# Patient Record
Sex: FEMALE | Race: WHITE | NOT HISPANIC OR LATINO
[De-identification: names, ages, dates, MRNs, and addresses within clinical notes are randomized per-mention and may not be internally consistent; named-entity substitution may affect disease eponyms.]

---

## 2021-08-23 ENCOUNTER — APPOINTMENT (OUTPATIENT)
Dept: HEART AND VASCULAR | Facility: CLINIC | Age: 86
End: 2021-08-23
Payer: MEDICARE

## 2021-08-23 ENCOUNTER — NON-APPOINTMENT (OUTPATIENT)
Age: 86
End: 2021-08-23

## 2021-08-23 VITALS — TEMPERATURE: 95.2 F

## 2021-08-23 VITALS
SYSTOLIC BLOOD PRESSURE: 125 MMHG | WEIGHT: 188 LBS | DIASTOLIC BLOOD PRESSURE: 60 MMHG | OXYGEN SATURATION: 97 % | BODY MASS INDEX: 33.31 KG/M2 | HEIGHT: 63 IN | HEART RATE: 90 BPM

## 2021-08-23 VITALS — TEMPERATURE: 97.2 F

## 2021-08-23 DIAGNOSIS — E78.5 HYPERLIPIDEMIA, UNSPECIFIED: ICD-10-CM

## 2021-08-23 DIAGNOSIS — Z78.9 OTHER SPECIFIED HEALTH STATUS: ICD-10-CM

## 2021-08-23 DIAGNOSIS — I25.10 ATHEROSCLEROTIC HEART DISEASE OF NATIVE CORONARY ARTERY W/OUT ANGINA PECTORIS: ICD-10-CM

## 2021-08-23 DIAGNOSIS — R53.83 OTHER FATIGUE: ICD-10-CM

## 2021-08-23 PROBLEM — Z00.00 ENCOUNTER FOR PREVENTIVE HEALTH EXAMINATION: Status: ACTIVE | Noted: 2021-08-23

## 2021-08-23 PROCEDURE — 93000 ELECTROCARDIOGRAM COMPLETE: CPT

## 2021-08-23 PROCEDURE — 99205 OFFICE O/P NEW HI 60 MIN: CPT | Mod: 25

## 2021-08-23 PROCEDURE — 36415 COLL VENOUS BLD VENIPUNCTURE: CPT

## 2021-08-25 LAB
ALBUMIN SERPL ELPH-MCNC: 4.5 G/DL
ALP BLD-CCNC: 69 U/L
ALT SERPL-CCNC: 16 U/L
ANION GAP SERPL CALC-SCNC: 12 MMOL/L
APPEARANCE: CLEAR
AST SERPL-CCNC: 23 U/L
BACTERIA: NEGATIVE
BASOPHILS # BLD AUTO: 0.02 K/UL
BASOPHILS NFR BLD AUTO: 0.3 %
BILIRUB SERPL-MCNC: 0.6 MG/DL
BILIRUBIN URINE: NEGATIVE
BLOOD URINE: NEGATIVE
BUN SERPL-MCNC: 44 MG/DL
CALCIUM SERPL-MCNC: 10 MG/DL
CHLORIDE SERPL-SCNC: 101 MMOL/L
CHOLEST SERPL-MCNC: 231 MG/DL
CO2 SERPL-SCNC: 25 MMOL/L
COLOR: YELLOW
CREAT SERPL-MCNC: 1.21 MG/DL
CREAT SPEC-SCNC: 227 MG/DL
EOSINOPHIL # BLD AUTO: 0.16 K/UL
EOSINOPHIL NFR BLD AUTO: 2.2 %
ESTIMATED AVERAGE GLUCOSE: 103 MG/DL
GLUCOSE QUALITATIVE U: NEGATIVE
GLUCOSE SERPL-MCNC: 134 MG/DL
HBA1C MFR BLD HPLC: 5.2 %
HCT VFR BLD CALC: 40.6 %
HDLC SERPL-MCNC: 71 MG/DL
HGB BLD-MCNC: 12.7 G/DL
HYALINE CASTS: 100 /LPF
IMM GRANULOCYTES NFR BLD AUTO: 0.1 %
KETONES URINE: NEGATIVE
LDLC SERPL CALC-MCNC: 111 MG/DL
LEUKOCYTE ESTERASE URINE: NEGATIVE
LYMPHOCYTES # BLD AUTO: 2.16 K/UL
LYMPHOCYTES NFR BLD AUTO: 29.4 %
MAGNESIUM SERPL-MCNC: 2 MG/DL
MAN DIFF?: NORMAL
MCHC RBC-ENTMCNC: 31.3 GM/DL
MCHC RBC-ENTMCNC: 31.7 PG
MCV RBC AUTO: 101.2 FL
MICROALBUMIN 24H UR DL<=1MG/L-MCNC: 1.5 MG/DL
MICROALBUMIN/CREAT 24H UR-RTO: 7 MG/G
MICROSCOPIC-UA: NORMAL
MONOCYTES # BLD AUTO: 0.55 K/UL
MONOCYTES NFR BLD AUTO: 7.5 %
NEUTROPHILS # BLD AUTO: 4.45 K/UL
NEUTROPHILS NFR BLD AUTO: 60.5 %
NITRITE URINE: NEGATIVE
NONHDLC SERPL-MCNC: 160 MG/DL
PH URINE: 5.5
PLATELET # BLD AUTO: 204 K/UL
POTASSIUM SERPL-SCNC: 4.8 MMOL/L
PROT SERPL-MCNC: 7.6 G/DL
PROTEIN URINE: NORMAL
RBC # BLD: 4.01 M/UL
RBC # FLD: 12.8 %
RED BLOOD CELLS URINE: 2 /HPF
SODIUM SERPL-SCNC: 138 MMOL/L
SPECIFIC GRAVITY URINE: 1.02
SQUAMOUS EPITHELIAL CELLS: 6 /HPF
TRIGL SERPL-MCNC: 242 MG/DL
TSH SERPL-ACNC: 1.8 UIU/ML
UROBILINOGEN URINE: NORMAL
WBC # FLD AUTO: 7.35 K/UL
WHITE BLOOD CELLS URINE: 1 /HPF

## 2021-08-31 ENCOUNTER — RESULT REVIEW (OUTPATIENT)
Age: 86
End: 2021-08-31

## 2021-08-31 ENCOUNTER — APPOINTMENT (OUTPATIENT)
Dept: CT IMAGING | Facility: CLINIC | Age: 86
End: 2021-08-31
Payer: MEDICARE

## 2021-08-31 ENCOUNTER — OUTPATIENT (OUTPATIENT)
Dept: OUTPATIENT SERVICES | Facility: HOSPITAL | Age: 86
LOS: 1 days | End: 2021-08-31

## 2021-08-31 PROBLEM — I25.10 CORONARY ARTERY DISEASE: Status: ACTIVE | Noted: 2021-08-31

## 2021-08-31 PROCEDURE — 75574 CT ANGIO HRT W/3D IMAGE: CPT | Mod: 26,MH

## 2021-08-31 RX ORDER — SIMVASTATIN 40 MG/1
40 TABLET, FILM COATED ORAL
Refills: 0 | Status: DISCONTINUED | COMMUNITY
End: 2021-08-31

## 2021-08-31 NOTE — PHYSICAL EXAM
[Well Developed] : well developed [No Acute Distress] : no acute distress [Well Nourished] : well nourished [Normal Conjunctiva] : normal conjunctiva [Normal Venous Pressure] : normal venous pressure [No Carotid Bruit] : no carotid bruit [Normal S1, S2] : normal S1, S2 [No Rub] : no rub [No Gallop] : no gallop [Clear Lung Fields] : clear lung fields [Good Air Entry] : good air entry [No Respiratory Distress] : no respiratory distress  [Soft] : abdomen soft [Non Tender] : non-tender [No Masses/organomegaly] : no masses/organomegaly [Normal Bowel Sounds] : normal bowel sounds [Normal Gait] : normal gait [No Edema] : no edema [No Cyanosis] : no cyanosis [No Clubbing] : no clubbing [No Varicosities] : no varicosities [No Rash] : no rash [No Skin Lesions] : no skin lesions [Moves all extremities] : moves all extremities [No Focal Deficits] : no focal deficits [Normal Speech] : normal speech [Alert and Oriented] : alert and oriented [Normal memory] : normal memory [de-identified] : 3/6 hsm apex

## 2021-08-31 NOTE — HISTORY OF PRESENT ILLNESS
[FreeTextEntry1] : \par \par 85 y/o female with h/o htn, hl, obese who presents for initial evaluation for fatigue and abnormal ekg\par \par no cp, lh, syncope, orthopnea, pnd, palpitations\par notes some sob with exertion\par notes new fatigue\par \par \par not very active\par states she started taking swimming lessons \par  - noted increasing fatigue exertion, noted lower bp that evening\par \par went to walk in clinic - told she might have had MI on ekg\par taken to [facility] and in ER - told cv enzyme neg and told to f/up with cardiology\par \par diagnosed with htn, hl 20 yrs ago\par used to see cardiologist - 2 times - past 10 yrs\par \par \par losartan increased from 50 to 100 qd 6 months ago\par put on hctz 6 months ago for edema\par \par PMH/PSH:\par htn\par hl\par s/p appy\par s/p zain\par arthritis\par left hip/right knee replacement \par uterine benign tumor removal\par breast benign tumor removal\par obese\par \par SH:\par no tobacco/etoh/drugs\par from [place]\par /[place]/[employer]\par single\par live alone\par no children\par \par \par ALL:\par nkda\par \par MEDS:\par asa 81 mg qod\par losartan 100 mg qd\par zocor 40 mg qhs\par diuretic\par \par FH:\par mother -  61 alcoholism, emphysema\par father -  \par sister - alive, 82, breast cancer

## 2021-08-31 NOTE — DISCUSSION/SUMMARY
[Patient] : the patient [___ Month(s)] : in [unfilled] month(s) [FreeTextEntry1] : \par \par 85 y/o female with h/o htn, hl, obese who presents for initial evaluation for fatigue and abnormal ekg\par \par -ordered ekg today - nsr, normal intervals, possible old IMI ernie\par -start asa daily\par -continue statin, losartan, hctz\par -ordered CTA cor \par -ordered Echo\par -counseled on cvd risk factors\par -ordered labs\par -f/up 3 months for htn, hl\par \par I have spent 60 minutes reviewing labs, records, tests and discussing htn, fatigue workup, cvd risk factors

## 2021-10-20 ENCOUNTER — FORM ENCOUNTER (OUTPATIENT)
Age: 86
End: 2021-10-20

## 2021-10-21 ENCOUNTER — OUTPATIENT (OUTPATIENT)
Dept: OUTPATIENT SERVICES | Facility: HOSPITAL | Age: 86
LOS: 1 days | End: 2021-10-21
Payer: MEDICARE

## 2021-10-21 DIAGNOSIS — R06.00 DYSPNEA, UNSPECIFIED: ICD-10-CM

## 2021-10-21 PROCEDURE — 93306 TTE W/DOPPLER COMPLETE: CPT | Mod: 26

## 2021-10-21 PROCEDURE — 93306 TTE W/DOPPLER COMPLETE: CPT

## 2021-10-30 ENCOUNTER — TRANSCRIPTION ENCOUNTER (OUTPATIENT)
Age: 86
End: 2021-10-30

## 2022-01-03 ENCOUNTER — RX RENEWAL (OUTPATIENT)
Age: 87
End: 2022-01-03

## 2022-07-28 ENCOUNTER — APPOINTMENT (OUTPATIENT)
Dept: HEART AND VASCULAR | Facility: CLINIC | Age: 87
End: 2022-07-28

## 2022-07-28 ENCOUNTER — NON-APPOINTMENT (OUTPATIENT)
Age: 87
End: 2022-07-28

## 2022-07-28 VITALS
DIASTOLIC BLOOD PRESSURE: 75 MMHG | HEART RATE: 77 BPM | TEMPERATURE: 97 F | HEIGHT: 63 IN | SYSTOLIC BLOOD PRESSURE: 112 MMHG | BODY MASS INDEX: 32.96 KG/M2 | OXYGEN SATURATION: 96 % | WEIGHT: 186 LBS

## 2022-07-28 PROCEDURE — 93000 ELECTROCARDIOGRAM COMPLETE: CPT

## 2022-07-28 PROCEDURE — 99214 OFFICE O/P EST MOD 30 MIN: CPT | Mod: 25

## 2022-07-28 RX ORDER — HYDROCHLOROTHIAZIDE 12.5 MG/1
12.5 CAPSULE ORAL
Qty: 90 | Refills: 0 | Status: DISCONTINUED | COMMUNITY
Start: 2021-06-16 | End: 2022-07-28

## 2022-07-28 NOTE — DISCUSSION/SUMMARY
[Patient] : the patient [EKG obtained to assist in diagnosis and management of assessed problem(s)] : EKG obtained to assist in diagnosis and management of assessed problem(s) [___ Week(s)] : in [unfilled] week(s) [FreeTextEntry1] : \par 88 y/o female with h/o htn, hl, obese, cad who presents for f/up today\par \par -ordered ekg today - nsr, normal intervals, possible old IMI ernie\par -continue asa, statin, losartan\par -dc hctz\par -CTA 8/21: calcium score 127, prox LAD minimal, D1 mild, mid RCA minimal, mild aortic and mild AV calcifications\par -Echo 10/21: LV normal size/fxn, ef 70-75%, no wma, trace mr/tr\par -counseled on cvd risk factors\par -labs 2022 reviewed\par -f/up 3 weeks for bp\par \par I have spent 30 minutes reviewing labs, records, tests and discussing cad management, bp\par \par

## 2022-07-28 NOTE — HISTORY OF PRESENT ILLNESS
[FreeTextEntry1] : 88 y/o female with h/o htn, hl, obese, cad who presents for f/up today\par \par last seen  for fatigue and abnormal ekg\par \par has been on asa, statin, hctz, arb\par \par \par last week notes episode low bp, dizziness\par notes sweating with exertion\par \par ordered CTA and Echo\par -CTA : calcium score 127, prox LAD minimal, D1 mild, mid RCA minimal, mild aortic and mild AV calcifications\par -Echo 10/21: LV normal size/fxn, ef 70-75%, no wma, trace mr/tr\par \par \par \par no cp, sob, lh, syncope, orthopnea, pnd, palpitations\par \par \par  \par  - noted increasing fatigue exertion, noted lower bp that evening\par went to walk in clinic - told she might have had MI on ekg\par taken to [facility] and in ER - told cv enzyme neg and told to f/up with cardiology\par \par diagnosed with htn, hl 20 yrs ago\par used to see cardiologist - 2 times - past 10 yrs\par \par \par \par PMH/PSH:\par htn\par hl\par s/p appy\par s/p zain\par arthritis\par left hip/right knee replacement \par uterine benign tumor removal\par breast benign tumor removal\par obese\par cad\par \par SH:\par no tobacco/etoh/drugs\par from [place]\par /[place]/[employer]\par single\par live alone\par no children\par \par \par ALL:\par nkda\par \par MEDS:\par asa 81 mg qod\par losartan 100 mg qd\par lipitor 40 mg qhs\par hctz 12.5 mg qd\par folic acid 800 mg qd\par hydroxyzine \par vit D3\par \par FH:\par mother -  61 alcoholism, emphysema\par father -  87\par sister - alive, 82, breast cancer \par

## 2022-08-11 ENCOUNTER — APPOINTMENT (OUTPATIENT)
Dept: HEART AND VASCULAR | Facility: CLINIC | Age: 87
End: 2022-08-11

## 2022-08-11 VITALS
BODY MASS INDEX: 33.66 KG/M2 | OXYGEN SATURATION: 96 % | HEIGHT: 63 IN | HEART RATE: 86 BPM | DIASTOLIC BLOOD PRESSURE: 60 MMHG | TEMPERATURE: 93.5 F | SYSTOLIC BLOOD PRESSURE: 93 MMHG | WEIGHT: 190 LBS

## 2022-08-11 VITALS — SYSTOLIC BLOOD PRESSURE: 95 MMHG | DIASTOLIC BLOOD PRESSURE: 65 MMHG

## 2022-08-11 PROCEDURE — 99214 OFFICE O/P EST MOD 30 MIN: CPT

## 2022-08-11 RX ORDER — LOSARTAN POTASSIUM 50 MG/1
50 TABLET, FILM COATED ORAL DAILY
Qty: 30 | Refills: 3 | Status: ACTIVE | COMMUNITY
Start: 1900-01-01 | End: 1900-01-01

## 2022-08-11 NOTE — HISTORY OF PRESENT ILLNESS
[FreeTextEntry1] : 88 y/o female with h/o htn, hl, obese, cad who presents for f/up today\par \par last seen  for for low bp/dizziness\par hctz recommended to dc but she did not\par she stopped the irbesartan\par \par has been on asa, statin, hctz, arb\par \par  \par \par ordered CTA and Echo\par -CTA : calcium score 127, prox LAD minimal, D1 mild, mid RCA minimal, mild aortic and mild AV calcifications\par -Echo 10/21: LV normal size/fxn, ef 70-75%, no wma, trace mr/tr\par \par \par \par no cp, sob, lh, syncope, orthopnea, pnd, palpitations\par \par \par  \par  - noted increasing fatigue exertion, noted lower bp that evening\par went to walk in clinic - told she might have had MI on ekg\par taken to [facility] and in ER - told cv enzyme neg and told to f/up with cardiology\par \par diagnosed with htn, hl 20 yrs ago\par used to see cardiologist - 2 times - past 10 yrs\par \par \par \par PMH/PSH:\par htn\par hl\par s/p appy\par s/p zain\par arthritis\par left hip/right knee replacement \par uterine benign tumor removal\par breast benign tumor removal\par obese\par cad\par \par SH:\par no tobacco/etoh/drugs\[place] from [place]\par /[place]\par single\par live alone\par no children\par \par \par ALL:\par nkda\par \par MEDS:\par asa 81 mg qod\par losartan 100 mg qd\par hctz 12.5 mg qd\par lipitor 40 mg qhs\par folic acid 800 mg qd\par hydroxyzine \par vit D3\par \par FH:\par mother -  61 alcoholism, emphysema\par father -  87\par sister - alive, 82, breast cancer \par

## 2022-08-11 NOTE — DISCUSSION/SUMMARY
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

## 2022-09-01 ENCOUNTER — APPOINTMENT (OUTPATIENT)
Dept: HEART AND VASCULAR | Facility: CLINIC | Age: 87
End: 2022-09-01

## 2022-09-01 VITALS — SYSTOLIC BLOOD PRESSURE: 128 MMHG | DIASTOLIC BLOOD PRESSURE: 75 MMHG | HEART RATE: 75 BPM

## 2022-09-01 VITALS
WEIGHT: 190 LBS | TEMPERATURE: 96.5 F | HEIGHT: 63 IN | BODY MASS INDEX: 33.66 KG/M2 | OXYGEN SATURATION: 96 % | DIASTOLIC BLOOD PRESSURE: 78 MMHG | HEART RATE: 84 BPM | SYSTOLIC BLOOD PRESSURE: 138 MMHG

## 2022-09-01 PROCEDURE — 99214 OFFICE O/P EST MOD 30 MIN: CPT

## 2022-09-01 NOTE — DISCUSSION/SUMMARY
[Patient] : the patient [___ Month(s)] : in [unfilled] month(s) [FreeTextEntry1] : 88 y/o female with h/o htn, hl, obese, cad who presents for f/up today\par \par -ekg 7/22 - nsr, normal intervals, possible old IMI ernie\par -continue asa, statin \par -continue losartan to 50 mg qd\par -CTA 8/21: calcium score 127, prox LAD minimal, D1 mild, mid RCA minimal, mild aortic and mild AV calcifications\par -Echo 10/21: LV normal size/fxn, ef 70-75%, no wma, trace mr/tr\par -counseled on cvd risk factors\par -labs 2022 reviewed\par -f/up 4-6 months for htn, cad\par \par I have spent 30 minutes reviewing labs, records, tests and discussing cad management, bp\par \par

## 2022-09-01 NOTE — HISTORY OF PRESENT ILLNESS
[FreeTextEntry1] : 86 y/o female with h/o htn, hl, obese, cad who presents for f/up today\par \par last seen  for for low bp/dizziness\par hctz dc'd and losartan lowered to 50\par \par \par  \par -CTA : calcium score 127, prox LAD minimal, D1 mild, mid RCA minimal, mild aortic and mild AV calcifications\par -Echo 10/21: LV normal size/fxn, ef 70-75%, no wma, trace mr/tr\par \par no cp, sob, lh, syncope, orthopnea, pnd, palpitations\par \par \par  \par  - noted increasing fatigue exertion, noted lower bp that evening\par went to walk in clinic - told she might have had MI on ekg\par taken to [facility] and in ER - told cv enzyme neg and told to f/up with cardiology\par \par diagnosed with htn, hl 20 yrs ago\par used to see cardiologist - 2 times - past 10 yrs\par \par \par \par PMH/PSH:\par htn\par hl\par s/p appy\par s/p zain\par arthritis\par left hip/right knee replacement \par uterine benign tumor removal\par breast benign tumor removal\par obese\par cad\par \par SH:\par no tobacco/etoh/drugs\[place] from [place]\par /[place]\par single\par live alone\par no children\par \par \par ALL:\par nkda\par \par MEDS:\par asa 81 mg qod\par losartan 50 mg qd\par lipitor 40 mg qhs\par folic acid 800 mg qd\par hydroxyzine \par vit D3\par \par FH:\par mother -  61 alcoholism, emphysema\par father -  87\par sister - alive, 82, breast cancer \par

## 2023-03-02 ENCOUNTER — APPOINTMENT (OUTPATIENT)
Dept: HEART AND VASCULAR | Facility: CLINIC | Age: 88
End: 2023-03-02
Payer: MEDICARE

## 2023-03-02 ENCOUNTER — NON-APPOINTMENT (OUTPATIENT)
Age: 88
End: 2023-03-02

## 2023-03-02 VITALS
WEIGHT: 190 LBS | OXYGEN SATURATION: 94 % | TEMPERATURE: 97 F | BODY MASS INDEX: 33.66 KG/M2 | SYSTOLIC BLOOD PRESSURE: 149 MMHG | HEIGHT: 63 IN | HEART RATE: 74 BPM | DIASTOLIC BLOOD PRESSURE: 75 MMHG

## 2023-03-02 VITALS — DIASTOLIC BLOOD PRESSURE: 80 MMHG | SYSTOLIC BLOOD PRESSURE: 130 MMHG

## 2023-03-02 PROCEDURE — 93000 ELECTROCARDIOGRAM COMPLETE: CPT

## 2023-03-02 PROCEDURE — 99214 OFFICE O/P EST MOD 30 MIN: CPT | Mod: 25

## 2023-03-02 NOTE — HISTORY OF PRESENT ILLNESS
[FreeTextEntry1] : 89 y/o female with h/o htn, hl, obese, cad who presents for f/up today\par \par last seen  \par \par went to [place] last few weeks, returned 2 weeks ago - noted LE edema (had stopped hctz last summer)\par no pain/tenderness/erythema\par  \par -CTA : calcium score 127, prox LAD minimal, D1 mild, mid RCA minimal, mild aortic and mild AV calcifications\par -Echo 10/21: LV normal size/fxn, ef 70-75%, no wma, trace mr/tr\par \par no cp, sob, lh, syncope, orthopnea, pnd, palpitations\par \par \par  \par  - noted increasing fatigue exertion, noted lower bp that evening\par went to walk in clinic - told she might have had MI on ekg\par taken to [facility] and in ER - told cv enzyme neg and told to f/up with cardiology\par \par diagnosed with htn, hl 20 yrs ago\par used to see cardiologist - 2 times - past 10 yrs\par \par \par \par PMH/PSH:\par htn\par hl\par s/p appy\par s/p zain\par arthritis\par left hip/right knee replacement \par uterine benign tumor removal\par breast benign tumor removal\par obese\par cad\par \par SH:\par no tobacco/etoh/drugs\[place] from [place]\par /[place]\par single\par live alone\par no children\par \par \par ALL:\par nkda\par \par MEDS:\par asa 81 mg qod\par losartan 50 mg qd\par lipitor 40 mg qhs\par folic acid 800 mg qd\par hydroxyzine \par vit D3\par \par FH:\par mother -  61 alcoholism, emphysema\par father -  87\par sister - alive, 82, breast cancer \par \par

## 2023-03-02 NOTE — DISCUSSION/SUMMARY
[Patient] : the patient [___ Week(s)] : in [unfilled] week(s) [EKG obtained to assist in diagnosis and management of assessed problem(s)] : EKG obtained to assist in diagnosis and management of assessed problem(s) [FreeTextEntry1] : 89 y/o female with h/o htn, hl, obese, cad who presents for f/up today\par \par -ordered Echo for le edema\par -ordered IRMA for edema to r/o dvt\par -ekg ordered today - nsr, normal intervals, no st/t changes\par -continue asa, statin \par -continue losartan 50 mg qd\par -start lasix 10 mg qd for edema, close monitoring bp\par -CTA 8/21: calcium score 127, prox LAD minimal, D1 mild, mid RCA minimal, mild aortic and mild AV calcifications\par -agree w f/up liver lesion seen on u/s, recommended mri\par -Echo 10/21: LV normal size/fxn, ef 70-75%, no wma, trace mr/tr\par -counseled on cvd risk factors\par -labs 2022 reviewed\par -f/up 2-3 weeks for bp, bmp/mg\par \par I have spent 30 minutes reviewing labs, records, tests and discussing cad management, bp

## 2023-03-03 ENCOUNTER — APPOINTMENT (OUTPATIENT)
Dept: HEART AND VASCULAR | Facility: CLINIC | Age: 88
End: 2023-03-03
Payer: MEDICARE

## 2023-03-03 ENCOUNTER — EMERGENCY (EMERGENCY)
Facility: HOSPITAL | Age: 88
LOS: 1 days | Discharge: HOME | End: 2023-03-03
Attending: EMERGENCY MEDICINE | Admitting: EMERGENCY MEDICINE
Payer: MEDICARE

## 2023-03-03 VITALS
SYSTOLIC BLOOD PRESSURE: 130 MMHG | HEART RATE: 78 BPM | OXYGEN SATURATION: 97 % | RESPIRATION RATE: 18 BRPM | TEMPERATURE: 98 F | HEIGHT: 63 IN | WEIGHT: 190.04 LBS | DIASTOLIC BLOOD PRESSURE: 77 MMHG

## 2023-03-03 VITALS
HEART RATE: 72 BPM | RESPIRATION RATE: 17 BRPM | DIASTOLIC BLOOD PRESSURE: 74 MMHG | OXYGEN SATURATION: 98 % | SYSTOLIC BLOOD PRESSURE: 134 MMHG | TEMPERATURE: 98 F

## 2023-03-03 DIAGNOSIS — R60.0 LOCALIZED EDEMA: ICD-10-CM

## 2023-03-03 DIAGNOSIS — R09.82 POSTNASAL DRIP: ICD-10-CM

## 2023-03-03 DIAGNOSIS — Z20.822 CONTACT WITH AND (SUSPECTED) EXPOSURE TO COVID-19: ICD-10-CM

## 2023-03-03 DIAGNOSIS — I25.10 ATHEROSCLEROTIC HEART DISEASE OF NATIVE CORONARY ARTERY WITHOUT ANGINA PECTORIS: ICD-10-CM

## 2023-03-03 DIAGNOSIS — R05.9 COUGH, UNSPECIFIED: ICD-10-CM

## 2023-03-03 DIAGNOSIS — I82.442 ACUTE EMBOLISM AND THROMBOSIS OF LEFT TIBIAL VEIN: ICD-10-CM

## 2023-03-03 DIAGNOSIS — E78.5 HYPERLIPIDEMIA, UNSPECIFIED: ICD-10-CM

## 2023-03-03 DIAGNOSIS — I82.432 ACUTE EMBOLISM AND THROMBOSIS OF LEFT POPLITEAL VEIN: ICD-10-CM

## 2023-03-03 DIAGNOSIS — R06.09 OTHER FORMS OF DYSPNEA: ICD-10-CM

## 2023-03-03 DIAGNOSIS — I10 ESSENTIAL (PRIMARY) HYPERTENSION: ICD-10-CM

## 2023-03-03 LAB
ANION GAP SERPL CALC-SCNC: 10 MMOL/L — SIGNIFICANT CHANGE UP (ref 5–17)
APTT BLD: 31.1 SEC — SIGNIFICANT CHANGE UP (ref 27.5–35.5)
BASOPHILS # BLD AUTO: 0.02 K/UL — SIGNIFICANT CHANGE UP (ref 0–0.2)
BASOPHILS NFR BLD AUTO: 0.3 % — SIGNIFICANT CHANGE UP (ref 0–2)
BUN SERPL-MCNC: 24 MG/DL — HIGH (ref 7–23)
CALCIUM SERPL-MCNC: 9.5 MG/DL — SIGNIFICANT CHANGE UP (ref 8.4–10.5)
CHLORIDE SERPL-SCNC: 102 MMOL/L — SIGNIFICANT CHANGE UP (ref 96–108)
CO2 SERPL-SCNC: 29 MMOL/L — SIGNIFICANT CHANGE UP (ref 22–31)
CREAT SERPL-MCNC: 0.97 MG/DL — SIGNIFICANT CHANGE UP (ref 0.5–1.3)
EGFR: 57 ML/MIN/1.73M2 — LOW
EOSINOPHIL # BLD AUTO: 0.19 K/UL — SIGNIFICANT CHANGE UP (ref 0–0.5)
EOSINOPHIL NFR BLD AUTO: 2.7 % — SIGNIFICANT CHANGE UP (ref 0–6)
GLUCOSE SERPL-MCNC: 100 MG/DL — HIGH (ref 70–99)
HCT VFR BLD CALC: 38.8 % — SIGNIFICANT CHANGE UP (ref 34.5–45)
HGB BLD-MCNC: 12.4 G/DL — SIGNIFICANT CHANGE UP (ref 11.5–15.5)
IMM GRANULOCYTES NFR BLD AUTO: 0.1 % — SIGNIFICANT CHANGE UP (ref 0–0.9)
INR BLD: 0.98 — SIGNIFICANT CHANGE UP (ref 0.88–1.16)
LYMPHOCYTES # BLD AUTO: 2.11 K/UL — SIGNIFICANT CHANGE UP (ref 1–3.3)
LYMPHOCYTES # BLD AUTO: 30.2 % — SIGNIFICANT CHANGE UP (ref 13–44)
MCHC RBC-ENTMCNC: 32 GM/DL — SIGNIFICANT CHANGE UP (ref 32–36)
MCHC RBC-ENTMCNC: 32 PG — SIGNIFICANT CHANGE UP (ref 27–34)
MCV RBC AUTO: 100 FL — SIGNIFICANT CHANGE UP (ref 80–100)
MONOCYTES # BLD AUTO: 0.56 K/UL — SIGNIFICANT CHANGE UP (ref 0–0.9)
MONOCYTES NFR BLD AUTO: 8 % — SIGNIFICANT CHANGE UP (ref 2–14)
NEUTROPHILS # BLD AUTO: 4.1 K/UL — SIGNIFICANT CHANGE UP (ref 1.8–7.4)
NEUTROPHILS NFR BLD AUTO: 58.7 % — SIGNIFICANT CHANGE UP (ref 43–77)
NRBC # BLD: 0 /100 WBCS — SIGNIFICANT CHANGE UP (ref 0–0)
PLATELET # BLD AUTO: 176 K/UL — SIGNIFICANT CHANGE UP (ref 150–400)
POTASSIUM SERPL-MCNC: 4.6 MMOL/L — SIGNIFICANT CHANGE UP (ref 3.5–5.3)
POTASSIUM SERPL-SCNC: 4.6 MMOL/L — SIGNIFICANT CHANGE UP (ref 3.5–5.3)
PROTHROM AB SERPL-ACNC: 11.6 SEC — SIGNIFICANT CHANGE UP (ref 10.5–13.4)
RBC # BLD: 3.88 M/UL — SIGNIFICANT CHANGE UP (ref 3.8–5.2)
RBC # FLD: 13.4 % — SIGNIFICANT CHANGE UP (ref 10.3–14.5)
SARS-COV-2 RNA SPEC QL NAA+PROBE: NEGATIVE — SIGNIFICANT CHANGE UP
SODIUM SERPL-SCNC: 141 MMOL/L — SIGNIFICANT CHANGE UP (ref 135–145)
WBC # BLD: 6.99 K/UL — SIGNIFICANT CHANGE UP (ref 3.8–10.5)
WBC # FLD AUTO: 6.99 K/UL — SIGNIFICANT CHANGE UP (ref 3.8–10.5)

## 2023-03-03 PROCEDURE — 93971 EXTREMITY STUDY: CPT | Mod: 26,LT

## 2023-03-03 PROCEDURE — 93971 EXTREMITY STUDY: CPT

## 2023-03-03 PROCEDURE — 85730 THROMBOPLASTIN TIME PARTIAL: CPT

## 2023-03-03 PROCEDURE — 99284 EMERGENCY DEPT VISIT MOD MDM: CPT | Mod: 25

## 2023-03-03 PROCEDURE — 36415 COLL VENOUS BLD VENIPUNCTURE: CPT

## 2023-03-03 PROCEDURE — 80048 BASIC METABOLIC PNL TOTAL CA: CPT

## 2023-03-03 PROCEDURE — 85025 COMPLETE CBC W/AUTO DIFF WBC: CPT

## 2023-03-03 PROCEDURE — 87635 SARS-COV-2 COVID-19 AMP PRB: CPT

## 2023-03-03 PROCEDURE — 99284 EMERGENCY DEPT VISIT MOD MDM: CPT | Mod: FS

## 2023-03-03 PROCEDURE — 85610 PROTHROMBIN TIME: CPT

## 2023-03-03 PROCEDURE — 93970 EXTREMITY STUDY: CPT

## 2023-03-03 RX ORDER — APIXABAN 2.5 MG/1
1 TABLET, FILM COATED ORAL
Qty: 60 | Refills: 0
Start: 2023-03-03 | End: 2023-04-01

## 2023-03-03 RX ORDER — APIXABAN 2.5 MG/1
10 TABLET, FILM COATED ORAL ONCE
Refills: 0 | Status: DISCONTINUED | OUTPATIENT
Start: 2023-03-03 | End: 2023-03-07

## 2023-03-03 NOTE — ED PROVIDER NOTE - PATIENT PORTAL LINK FT
You can access the FollowMyHealth Patient Portal offered by [facility] by registering at the following website: [email or web address]. By joining [employer]’s FollowMyHealth portal, you will also be able to view your health information using other applications (apps) compatible with our system.

## 2023-03-03 NOTE — ED PROVIDER NOTE - CLINICAL SUMMARY MEDICAL DECISION MAKING FREE TEXT BOX
88-year-old female with a left lower extremity DVT found at her cardiologist's office ultrasound.  She is well-appearing and there is no sign of infection in the leg.  She admits to very mild exertional dyspnea that she attributes to cough with postnasal drip; there is no hemoptysis and she has no signs of distress. There is no tachycardia, tachypnea, or hypoxemia.  US results not in the EMR, so will need to obtain a formal US here to determine extent and location of DVT.  Will check labs in anticipation of starting AC.  Suspicion for PE is low. 88-year-old female with a left lower extremity DVT found at her cardiologist's office ultrasound.  She is well-appearing and there is no sign of infection in the leg.  She admits to very mild exertional dyspnea that she attributes to cough with postnasal drip; there is no hemoptysis and she has no signs of distress. There is no tachycardia, tachypnea, or hypoxemia.  US results not in the EMR, so will need to obtain a formal US here to determine extent and location of DVT.  Will check labs in anticipation of starting AC.  Pt has no hx of hemorrhagic stroke, GI bleed, or other stigmata of bleeding diathesis.  Suspicion for PE is low.

## 2023-03-03 NOTE — ED PROVIDER NOTE - OBJECTIVE STATEMENT
Patient is an 88-year-old female with a history of CAD, chronic lower extremity edema, hypertension, hyperlipidemia,  who recently traveled to [place]; she spent a lot of time traveling by car and plane, and  was sedentary during much of the trip.    She had a routine appointment with Dr. Rodrigues yesterday and was sent for an ultrasound of the left leg today; she was informed that she had a DVT in the leg and was advised to come to the emergency department.    She denies any fever chills, chest pain, palpitations, shortness of breath, or hemoptysis.  She admits to very mild exertional dyspnea that she attributes to cough with postnasal drip.

## 2023-03-03 NOTE — ED ADULT NURSE NOTE - OBJECTIVE STATEMENT
pt has swelling to LLE , states that she had an ultrasound today which showed a blood clot in her leg , only having mild pain below left knee , no other complaints

## 2023-03-03 NOTE — ED PROVIDER NOTE - NSFOLLOWUPINSTRUCTIONS_ED_ALL_ED_FT
Take Eliquis 10 mg twice daily for the first 7 days.  After the first 7 days, the dose will decrease to 5 mg twice daily.    Please follow up with Dr Pollack on Monday.  She may have you see a hematologist as well.      Return to the Emergency Department if you have any new or worsening symptoms, or if you have any concerns.  ===================    Deep Vein Thrombosis    WHAT YOU NEED TO KNOW:    Deep vein thrombosis (DVT) is a blood clot that forms in a deep vein of the body. The DVT can break into smaller pieces and travel to your lungs and cause a blockage called a pulmonary embolism. A PE can become life-threatening. It is important to go to all follow-up appointments and to take blood thinners as directed. This is especially important if you were discharged home from the emergency department.        DISCHARGE INSTRUCTIONS:    Call your local emergency number (911 in the ) if:   •You feel lightheaded, short of breath, and have chest pain.      •You cough up blood.      Return to the emergency department if:   •Your arm or leg feels warm, tender, and painful. It may look swollen and red.      Call your doctor or hematologist if:   •You have questions or concerns about your condition or care.      Medicines:   •Blood thinners help prevent blood clots. Clots can cause strokes, heart attacks, and death. The following are general safety guidelines to follow while you are taking a blood thinner:?Watch for bleeding and bruising while you take blood thinners. Watch for bleeding from your gums or nose. Watch for blood in your urine and bowel movements. Use a soft washcloth on your skin, and a soft toothbrush to brush your teeth. This can keep your skin and gums from bleeding. If you shave, use an electric shaver. Do not play contact sports.       ?Tell your dentist and other healthcare providers that you take a blood thinner. Wear a bracelet or necklace that says you take this medicine.       ?Do not start or stop any other medicines unless your healthcare provider tells you to. Many medicines cannot be used with blood thinners.      ?Take your blood thinner exactly as prescribed by your healthcare provider. Do not skip does or take less than prescribed. Tell your provider right away if you forget to take your blood thinner, or if you take too much.      ?Warfarin is a blood thinner that you may need to take. The following are things you should be aware of if you take warfarin: ?Foods and medicines can affect the amount of warfarin in your blood. Do not make major changes to your diet while you take warfarin. Warfarin works best when you eat about the same amount of vitamin K every day. Vitamin K is found in green leafy vegetables and certain other foods. Ask for more information about what to eat when you are taking warfarin.      ?You will need to see your healthcare provider for follow-up visits when you are on warfarin. You will need regular blood tests. These tests are used to decide how much medicine you need.         •Take your medicine as directed. Contact your healthcare provider if you think your medicine is not helping or if you have side effects. Tell your provider if you are allergic to any medicine. Keep a list of the medicines, vitamins, and herbs you take. Include the amounts, and when and why you take them. Bring the list or the pill bottles to follow-up visits. Carry your medicine list with you in case of an emergency.      Manage a DVT:   •Wear pressure stockings as directed. The stockings put pressure on your legs. This improves blood flow and helps prevent clots. Wear the stockings during the day. Do not wear them when you sleep.      •Elevate your legs above the level of your heart. Elevate your legs when you sit or lie down, as often as you can. This will help decrease swelling and pain. Prop your legs on pillows or blankets to keep them elevated comfortably.      Prevent a DVT:   •Exercise regularly to help increase your blood flow. Walking is a good low-impact exercise. Talk to your healthcare provider about the best exercise plan for you.      •Change your body position or move around often. Move and stretch in your seat several times each hour if you travel by car or work at a desk. In an airplane, get up and walk every hour. Move your legs by tightening and releasing your leg muscles while sitting. You can move your legs while sitting by raising and lowering your heels. Keep your toes on the floor while you do this. You can also raise and lower your toes while keeping your heels on the floor.      •Maintain a healthy weight. Ask your healthcare provider what a healthy weight is for you. Ask him or her to help you create a weight loss plan if you are overweight.      •Do not smoke. Nicotine and other chemicals in cigarettes and cigars can damage blood vessels and make it more difficult to manage your DVT. Ask your healthcare provider for information if you currently smoke and need help to quit. E-cigarettes or smokeless tobacco still contain nicotine. Talk to your healthcare provider before you use these products.      •Ask about birth control if you are a woman who takes the pill. A birth control pill increases the risk for PE in certain women. The risk is higher if you are also older than 35, smoke cigarettes, or have a blood clotting disorder. Talk to your healthcare provider about other ways to prevent pregnancy, such as a cervical cap or intrauterine device (IUD).      Follow up with your doctor or hematologist as directed: You may need to come in regularly for scans to check for blood clots. Your blood may be checked to see how long it takes to clot. Your doctor or specialist will tell you if you need to have this test and how often to have it. Write down your questions so you remember to ask them during your visits.

## 2023-03-03 NOTE — ED PROVIDER NOTE - CARE PROVIDER_API CALL
Enid Saab)  Cardiology  [address], [address]  [place], [place] [address]  Phone: [phone]  Fax: [phone]  Follow Up Time:

## 2023-03-03 NOTE — ED PROVIDER NOTE - PHYSICAL EXAMINATION
CONSTITUTIONAL: NAD   SKIN: Normal color and turgor.    HEAD: NC/AT.  EYES: Conjunctiva clear. EOMI. PERRL.    ENT: Airway clear. Normal voice.   RESPIRATORY:  Normal work of breathing. Lungs CTAB.  CARDIOVASCULAR:  RRR, S1S2. No M/R/G.      GI:  Abdomen soft, nontender.    MSK: Neck supple. +1 left calf edema; no erythema/warmth/tenderness.  No joint swelling or ROM limitation.  NEURO: Alert; CN: grossly intact. Speech clear.  MEHTA. Gait steady.

## 2023-03-03 NOTE — ED PROVIDER NOTE - NS ED ATTENDING STATEMENT MOD
This was a shared visit with the IRVIN. I reviewed and verified the documentation and independently performed the documented:

## 2023-03-06 RX ORDER — FUROSEMIDE 20 MG/1
20 TABLET ORAL DAILY
Qty: 15 | Refills: 6 | Status: COMPLETED | COMMUNITY
Start: 2023-03-02 | End: 2023-03-06

## 2023-03-07 ENCOUNTER — NON-APPOINTMENT (OUTPATIENT)
Age: 88
End: 2023-03-07

## 2023-03-08 ENCOUNTER — APPOINTMENT (OUTPATIENT)
Dept: HEART AND VASCULAR | Facility: CLINIC | Age: 88
End: 2023-03-08
Payer: MEDICARE

## 2023-03-08 VITALS
HEIGHT: 63 IN | OXYGEN SATURATION: 94 % | SYSTOLIC BLOOD PRESSURE: 138 MMHG | DIASTOLIC BLOOD PRESSURE: 75 MMHG | HEART RATE: 77 BPM | WEIGHT: 188 LBS | BODY MASS INDEX: 33.31 KG/M2

## 2023-03-08 PROCEDURE — 93306 TTE W/DOPPLER COMPLETE: CPT

## 2023-03-16 ENCOUNTER — APPOINTMENT (OUTPATIENT)
Dept: HEART AND VASCULAR | Facility: CLINIC | Age: 88
End: 2023-03-16
Payer: MEDICARE

## 2023-03-16 VITALS
HEIGHT: 63 IN | DIASTOLIC BLOOD PRESSURE: 72 MMHG | HEART RATE: 87 BPM | OXYGEN SATURATION: 96 % | SYSTOLIC BLOOD PRESSURE: 115 MMHG | WEIGHT: 184 LBS | BODY MASS INDEX: 32.6 KG/M2

## 2023-03-16 DIAGNOSIS — R06.09 OTHER FORMS OF DYSPNEA: ICD-10-CM

## 2023-03-16 PROCEDURE — 93970 EXTREMITY STUDY: CPT

## 2023-03-16 PROCEDURE — 99214 OFFICE O/P EST MOD 30 MIN: CPT | Mod: 25

## 2023-03-30 ENCOUNTER — APPOINTMENT (OUTPATIENT)
Dept: HEART AND VASCULAR | Facility: CLINIC | Age: 88
End: 2023-03-30
Payer: MEDICARE

## 2023-03-30 VITALS
HEIGHT: 63 IN | SYSTOLIC BLOOD PRESSURE: 113 MMHG | DIASTOLIC BLOOD PRESSURE: 73 MMHG | WEIGHT: 184 LBS | BODY MASS INDEX: 32.6 KG/M2 | TEMPERATURE: 97.2 F | HEART RATE: 64 BPM | OXYGEN SATURATION: 95 %

## 2023-03-30 PROCEDURE — 99214 OFFICE O/P EST MOD 30 MIN: CPT | Mod: 25

## 2023-03-30 NOTE — DISCUSSION/SUMMARY
[Patient] : the patient [___ Month(s)] : in [unfilled] month(s) [FreeTextEntry1] : 87 y/o female with h/o htn, hl, obese, cad, dvt who presents for f/up today\par \par -Echo 3/23: ef 60-65%, isolated upper septal hypertrophy up to 15mm, mild ai, trace mr/tr, could not estimate pap, normal rap, trace pericardial effusion\par -IRMA 2023: showed left posterior tibial acute occlusive dvt and left popliteal vein subacute dvt\par -on eliquis for dvt\par -GI referral \par -f/up w Dr. Rousseau for dvt\par -f/up with heme for dvt\par -ekg 3/23 - nsr, normal intervals, no st/t changes\par -continue statin \par -off asa now as on eliquis\par -continue losartan 50 mg qd\par -CTA 8/21: calcium score 127, prox LAD minimal, D1 mild, mid RCA minimal, mild aortic and mild AV calcifications\par -agree w f/up liver lesion seen on u/s, recommended mri\par -Echo 10/21: LV normal size/fxn, ef 70-75%, no wma, trace mr/tr\par -counseled on cvd risk factors\par -labs 2022 reviewed\par -f/up 4 months for dvt, htn\par \par I have spent 30 minutes reviewing labs, records, tests and discussing cad management, bp, dvt \par \par \par

## 2023-03-30 NOTE — HISTORY OF PRESENT ILLNESS
[FreeTextEntry1] : 89 y/o female with h/o htn, hl, obese, cad, dvt who presents for f/up today\par \par last seen 3/22\par \par Echo 3/23: ef 60-65%, isolated upper septal hypertrophy up to 15mm, mild ai, trace mr/tr, could not estimate pap, normal rap, trace pericardial effusion\par \par IRMA : showed left posterior tibial acute occlusive dvt and left popliteal vein subacute dvt\par \par diagnosed w DVT\par started on eliquis 5 mg bid\par seen by Dr. Rousseau\par seen by Zo  \par \par no cp, sob, lh, syncope, orthopnea, pnd, palpitations\par  \par -CTA : calcium score 127, prox LAD minimal, D1 mild, mid RCA minimal, mild aortic and mild AV calcifications\par -Echo 10/21: LV normal size/fxn, ef 70-75%, no wma, trace mr/tr\par \par \par \par  \par  - noted increasing fatigue exertion, noted lower bp that evening\par went to walk in clinic - told she might have had MI on ekg\par taken to [facility] and in ER - told cv enzyme neg and told to f/up with cardiology\par \par diagnosed with htn, hl 20 yrs ago\par used to see cardiologist - 2 times - past 10 yrs\par \par \par \par PMH/PSH:\par htn\par hl\par s/p appy\par s/p zain\par arthritis\par left hip/right knee replacement \par uterine benign tumor removal\par breast benign tumor removal\par obese\par cad\par dvt\par \par SH:\par no tobacco/etoh/drugs\par from [place]\par /[place]\par single\par live alone\par no children\par \par \par ALL:\par nkda\par \par MEDS:\par eliquis 5 mg bid\par losartan 50 mg qd\par lipitor 40 mg qhs\par folic acid 800 mg qd\par hydroxyzine \par vit D3\par \par FH:\par mother -  61 alcoholism, emphysema\par father -  87\par sister - alive, 82, breast cancer \par

## 2023-06-07 ENCOUNTER — APPOINTMENT (OUTPATIENT)
Dept: HEART AND VASCULAR | Facility: CLINIC | Age: 88
End: 2023-06-07
Payer: MEDICARE

## 2023-06-07 VITALS
SYSTOLIC BLOOD PRESSURE: 130 MMHG | HEART RATE: 72 BPM | DIASTOLIC BLOOD PRESSURE: 78 MMHG | TEMPERATURE: 97.6 F | WEIGHT: 188 LBS | BODY MASS INDEX: 33.31 KG/M2 | OXYGEN SATURATION: 95 % | HEIGHT: 63 IN

## 2023-06-07 PROCEDURE — 99213 OFFICE O/P EST LOW 20 MIN: CPT | Mod: 25

## 2023-06-07 PROCEDURE — 93971 EXTREMITY STUDY: CPT | Mod: LT

## 2023-06-30 ENCOUNTER — NON-APPOINTMENT (OUTPATIENT)
Age: 88
End: 2023-06-30

## 2023-07-05 ENCOUNTER — APPOINTMENT (OUTPATIENT)
Dept: HEART AND VASCULAR | Facility: CLINIC | Age: 88
End: 2023-07-05
Payer: MEDICARE

## 2023-07-05 VITALS
TEMPERATURE: 98.1 F | SYSTOLIC BLOOD PRESSURE: 124 MMHG | HEIGHT: 63 IN | HEART RATE: 88 BPM | OXYGEN SATURATION: 95 % | DIASTOLIC BLOOD PRESSURE: 80 MMHG | WEIGHT: 193 LBS | BODY MASS INDEX: 34.2 KG/M2

## 2023-07-05 PROCEDURE — 99214 OFFICE O/P EST MOD 30 MIN: CPT | Mod: 25

## 2023-07-05 RX ORDER — APIXABAN 5 MG/1
5 TABLET, FILM COATED ORAL
Qty: 60 | Refills: 5 | Status: DISCONTINUED | COMMUNITY
Start: 1900-01-01 | End: 2023-07-05

## 2023-07-05 NOTE — DISCUSSION/SUMMARY
[Patient] : the patient [___ Month(s)] : in [unfilled] month(s) [FreeTextEntry1] : 87 y/o female with h/o htn, hl, obese, cad, dvt who presents for f/up today\par \par -Echo 3/23: ef 60-65%, isolated upper septal hypertrophy up to 15mm, mild ai, trace mr/tr, could not estimate pap, normal rap, trace pericardial effusion\par -IRMA 2023: showed left posterior tibial acute occlusive dvt and left popliteal vein subacute dvt\par -off eliquis for dvt\par -GI referral \par -f/up w Dr. Rousseau for dvt - continue asa 325 \par -f/up with heme for dvt\par -ekg 3/23 - nsr, normal intervals, no st/t changes\par -continue statin \par -off asa now as on eliquis\par -continue losartan 50 mg qd\par -CTA 8/21: calcium score 127, prox LAD minimal, D1 mild, mid RCA minimal, mild aortic and mild AV calcifications \par -Echo 10/21: LV normal size/fxn, ef 70-75%, no wma, trace mr/tr\par -counseled on cvd risk factors\par -labs 2022 reviewed\par -f/up 4 months for dvt, htn\par \par I have spent 30 minutes reviewing labs, records, tests and discussing cad management, bp, dvt \par

## 2023-07-05 NOTE — HISTORY OF PRESENT ILLNESS
[FreeTextEntry1] : 87 y/o female with h/o htn, hl, obese, cad, dvt who presents for f/up today\par \par last seen 3/22\par \par diagnosed w DVT\par started on eliquis 5 mg bid 3/23\par stopped eliquis \par has not started aspirin yet \par \par no cp, sob, lh, syncope, orthopnea, pnd, palpitations\par \par since then noted some mild le edema in ankles in past day or two\par \par \par Echo 3/23: ef 60-65%, isolated upper septal hypertrophy up to 15mm, mild ai, trace mr/tr, could not estimate pap, normal rap, trace pericardial effusion\par \par IRMA : showed left posterior tibial acute occlusive dvt and left popliteal vein subacute dvt\par \par \par seen by Dr. Rousseau\par seen by Zo \par \par  \par -CTA : calcium score 127, prox LAD minimal, D1 mild, mid RCA minimal, mild aortic and mild AV calcifications\par -Echo 10/21: LV normal size/fxn, ef 70-75%, no wma, trace mr/tr\par \par \par \par  \par  - noted increasing fatigue exertion, noted lower bp that evening\par went to walk in clinic - told she might have had MI on ekg\par taken to [facility] and in ER - told cv enzyme neg and told to f/up with cardiology\par \par diagnosed with htn, hl 20 yrs ago\par used to see cardiologist - 2 times - past 10 yrs\par \par \par \par PMH/PSH:\par htn\par hl\par s/p appy\par s/p zain\par arthritis\par left hip/right knee replacement \par uterine benign tumor removal\par breast benign tumor removal\par obese\par cad\par dvt\par \par SH:\par no tobacco/etoh/drugs\par from [place]\par /[place]\par single\par live alone\par no children\par \par \par ALL:\par nkda\par \par MEDS:\par losartan 50 mg qd\par lipitor 40 mg qhs\par folic acid 800 mg qd\par hydroxyzine \par vit D3\par \par FH:\par mother -  61 alcoholism, emphysema\par father -  87\par sister - alive, 82, breast cancer \par

## 2023-07-06 ENCOUNTER — NON-APPOINTMENT (OUTPATIENT)
Age: 88
End: 2023-07-06

## 2023-07-09 ENCOUNTER — NON-APPOINTMENT (OUTPATIENT)
Age: 88
End: 2023-07-09

## 2023-09-08 ENCOUNTER — APPOINTMENT (OUTPATIENT)
Dept: HEART AND VASCULAR | Facility: CLINIC | Age: 88
End: 2023-09-08
Payer: MEDICARE

## 2023-09-08 VITALS
HEIGHT: 63 IN | BODY MASS INDEX: 33.49 KG/M2 | OXYGEN SATURATION: 96 % | HEART RATE: 73 BPM | DIASTOLIC BLOOD PRESSURE: 80 MMHG | WEIGHT: 189 LBS | SYSTOLIC BLOOD PRESSURE: 118 MMHG | TEMPERATURE: 98.6 F

## 2023-09-08 DIAGNOSIS — I82.492 ACUTE EMBOLISM AND THROMBOSIS OF OTHER SPECIFIED DEEP VEIN OF LEFT LOWER EXTREMITY: ICD-10-CM

## 2023-09-08 DIAGNOSIS — M25.572 PAIN IN LEFT ANKLE AND JOINTS OF LEFT FOOT: ICD-10-CM

## 2023-09-08 DIAGNOSIS — I10 ESSENTIAL (PRIMARY) HYPERTENSION: ICD-10-CM

## 2023-09-08 DIAGNOSIS — M19.90 UNSPECIFIED OSTEOARTHRITIS, UNSPECIFIED SITE: ICD-10-CM

## 2023-09-08 DIAGNOSIS — E66.9 OBESITY, UNSPECIFIED: ICD-10-CM

## 2023-09-08 DIAGNOSIS — R60.0 LOCALIZED EDEMA: ICD-10-CM

## 2023-09-08 PROCEDURE — 93970 EXTREMITY STUDY: CPT

## 2023-09-08 PROCEDURE — ZZZZZ: CPT

## 2023-09-08 PROCEDURE — 99214 OFFICE O/P EST MOD 30 MIN: CPT | Mod: 25

## 2023-09-09 LAB — DEPRECATED D DIMER PPP IA-ACNC: 346 NG/ML DDU

## 2023-12-13 ENCOUNTER — APPOINTMENT (OUTPATIENT)
Dept: HEART AND VASCULAR | Facility: CLINIC | Age: 88
End: 2023-12-13
Payer: MEDICARE

## 2023-12-13 VITALS
HEIGHT: 63 IN | TEMPERATURE: 95.9 F | OXYGEN SATURATION: 84 % | DIASTOLIC BLOOD PRESSURE: 60 MMHG | WEIGHT: 186 LBS | SYSTOLIC BLOOD PRESSURE: 98 MMHG | HEART RATE: 6 BPM | BODY MASS INDEX: 32.96 KG/M2

## 2023-12-13 VITALS — HEART RATE: 58 BPM

## 2023-12-13 PROCEDURE — 93000 ELECTROCARDIOGRAM COMPLETE: CPT

## 2023-12-13 PROCEDURE — 99214 OFFICE O/P EST MOD 30 MIN: CPT | Mod: 25

## 2023-12-13 RX ORDER — ASPIRIN 81 MG/1
81 TABLET, DELAYED RELEASE ORAL
Qty: 90 | Refills: 1 | Status: ACTIVE | COMMUNITY
Start: 2023-07-05 | End: 1900-01-01

## 2023-12-13 NOTE — HISTORY OF PRESENT ILLNESS
[FreeTextEntry1] : 87 y/o female with h/o htn, hl, obese, cad, dvt who presents for f/up today    last seen    no cp, sob, lh, syncope, orthopnea, pnd, palpitations  diagnosed w DVT  started on eliquis 5 mg bid 3/23 stopped eliquis       Echo 3/23: ef 60-65%, isolated upper septal hypertrophy up to 15mm, mild ai, trace mr/tr, could not estimate pap, normal rap, trace pericardial effusion    IRMA : showed left posterior tibial acute occlusive dvt and left popliteal vein subacute dvt      seen [facility] Dr. Rousseau  [facility]      -CTA : calcium score 127, prox LAD minimal, D1 mild, mid RCA minimal, mild aortic and mild AV calcifications  -Echo 10/21: LV normal size/fxn, ef 70-75%, no wma, trace mr/tr           - noted increasing fatigue exertion, noted lower bp that evening  went to walk in clinic - told she might have had MI on ekg  taken to [facility] and in ER - told cv enzyme neg and told to f/up with cardiology    diagnosed with htn, hl 20 yrs ago  used to see cardiologist - 2 times - past 10 yrs        PMH/PSH:  htn  hl  s/p appy  s/p zain  arthritis  left hip/right knee replacement   uterine benign tumor removal  breast benign tumor removal  obese  cad  dvt    SH:  no tobacco/etoh/drugs  from [place]  /[place]  single  live alone  no children      ALL:  nkda    MEDS: asa 325 mg qd  losartan 50 mg qd  lipitor 80 mg qhs  folic acid 800 mg qd  hydroxyzine  vit D3    FH:  mother -  61 alcoholism, emphysema  father -  87  sister - alive, 82, breast cancer

## 2023-12-13 NOTE — DISCUSSION/SUMMARY
[Patient] : the patient [EKG obtained to assist in diagnosis and management of assessed problem(s)] : EKG obtained to assist in diagnosis and management of assessed problem(s) [___ Month(s)] : in [unfilled] month(s) [FreeTextEntry1] : 89 y/o female with h/o htn, hl, obese, cad, dvt who presents for f/up today  -ekg ordered today - SB, possible prior inf mi ernie  -Echo 3/23: ef 60-65%, isolated upper septal hypertrophy up to 15mm, mild ai, trace mr/tr, could not estimate pap, normal rap, trace pericardial effusion  -IRMA 2023: showed left posterior tibial acute occlusive dvt and left popliteal vein subacute dvt  -off eliquis for dvt  -GI referral  -f/up w Dr. Rousseau for dvt - continue asa - will change from 325 to 81  -f/up with heme for dvt - recommended eliquis pre any flights  -ekg 3/23 - nsr, normal intervals, no st/t changes  -continue statin  -continue asa  -continue losartan 50 mg qd  -CTA 8/21: calcium score 127, prox LAD minimal, D1 mild, mid RCA minimal, mild aortic and mild AV calcifications  -Echo 10/21: LV normal size/fxn, ef 70-75%, no wma, trace mr/tr  -counseled on cvd risk factors  -labs 2023 reviewed  -f/up 6 months for dvt, htn    I have spent 30 minutes reviewing labs, records, tests and discussing cad management, bp, dvt .

## 2023-12-14 LAB
CHOLEST SERPL-MCNC: 168 MG/DL
HDLC SERPL-MCNC: 84 MG/DL
LDLC SERPL CALC-MCNC: 70 MG/DL
NONHDLC SERPL-MCNC: 84 MG/DL
TRIGL SERPL-MCNC: 75 MG/DL

## 2024-01-29 ENCOUNTER — TRANSCRIPTION ENCOUNTER (OUTPATIENT)
Age: 89
End: 2024-01-29

## 2024-05-13 ENCOUNTER — RX RENEWAL (OUTPATIENT)
Age: 89
End: 2024-05-13

## 2024-05-13 RX ORDER — ATORVASTATIN CALCIUM 80 MG/1
80 TABLET, FILM COATED ORAL
Qty: 1 | Refills: 1 | Status: ACTIVE | COMMUNITY
Start: 2021-08-31 | End: 1900-01-01

## 2024-06-19 ENCOUNTER — APPOINTMENT (OUTPATIENT)
Dept: HEART AND VASCULAR | Facility: CLINIC | Age: 89
End: 2024-06-19
Payer: MEDICARE

## 2024-06-19 VITALS
HEIGHT: 63 IN | WEIGHT: 185 LBS | OXYGEN SATURATION: 95 % | HEART RATE: 68 BPM | SYSTOLIC BLOOD PRESSURE: 131 MMHG | DIASTOLIC BLOOD PRESSURE: 87 MMHG | BODY MASS INDEX: 32.78 KG/M2 | TEMPERATURE: 97.1 F

## 2024-06-19 VITALS — SYSTOLIC BLOOD PRESSURE: 110 MMHG | DIASTOLIC BLOOD PRESSURE: 80 MMHG

## 2024-06-19 PROCEDURE — 93000 ELECTROCARDIOGRAM COMPLETE: CPT

## 2024-06-19 PROCEDURE — G2211 COMPLEX E/M VISIT ADD ON: CPT

## 2024-06-19 PROCEDURE — 99214 OFFICE O/P EST MOD 30 MIN: CPT

## 2024-06-19 PROCEDURE — 36415 COLL VENOUS BLD VENIPUNCTURE: CPT

## 2024-06-19 NOTE — DISCUSSION/SUMMARY
[Patient] : the patient [___ Month(s)] : in [unfilled] month(s) [EKG obtained to assist in diagnosis and management of assessed problem(s)] : EKG obtained to assist in diagnosis and management of assessed problem(s) [FreeTextEntry1] : 88 y/o female with h/o htn, hl, obese, cad, dvt who presents for f/up today  -ekg ordered today - SB w pac, no st/t changes  -Echo 3/23: ef 60-65%, isolated upper septal hypertrophy up to 15mm, mild ai, trace mr/tr, could not estimate pap, normal rap, trace pericardial effusion  -IRMA 2023: showed left posterior tibial acute occlusive dvt and left popliteal vein subacute dvt  -off eliquis for dvt  -f/up w Dr. Rousseau for dvt - continue asa   -f/up with heme for dvt - recommended eliquis pre any flights  -ekg 3/23 - nsr, normal intervals, no st/t changes  -continue statin  -continue asa  -continue losartan 50 mg qd  -CTA 8/21: calcium score 127, prox LAD minimal, D1 mild, mid RCA minimal, mild aortic and mild AV calcifications  -Echo 10/21: LV normal size/fxn, ef 70-75%, no wma, trace mr/tr  -counseled on cvd risk factors  -labs 2023 reviewed, labs ordered today  -f/up 6 months for dvt, htn    I have spent 30 minutes reviewing labs, records, tests and discussing cad management, bp, dvt

## 2024-06-19 NOTE — HISTORY OF PRESENT ILLNESS
[FreeTextEntry1] : 89 y/o female with h/o htn, hl, obese, cad, dvt who presents for f/up today    last seen    no cp, sob, lh, syncope, orthopnea, pnd, palpitations  diagnosed w DVT  started on eliquis 5 mg bid 3/23 stopped eliquis       Echo 3/23: ef 60-65%, isolated upper septal hypertrophy up to 15mm, mild ai, trace mr/tr, could not estimate pap, normal rap, trace pericardial effusion    IRMA : showed left posterior tibial acute occlusive dvt and left popliteal vein subacute dvt      [facility] Dr. Rousseau  [facility]      -CTA : calcium score 127, prox LAD minimal, D1 mild, mid RCA minimal, mild aortic and mild AV calcifications  -Echo 10/21: LV normal size/fxn, ef 70-75%, no wma, trace mr/tr           - noted increasing fatigue exertion, noted lower bp that evening  went to walk in clinic - told she might have had MI on ekg  taken to [facility] and in ER - told cv enzyme neg and told to f/up with cardiology    diagnosed with htn, hl 20 yrs ago  used to see cardiologist - 2 times - past 10 yrs        PMH/PSH:  htn  hl  s/p appy  s/p zain  arthritis  left hip/right knee replacement   uterine benign tumor removal  breast benign tumor removal  obese  cad  dvt    SH:  no tobacco/etoh/drugs  from [place]  /[place] [place]  single  live alone  no children      ALL:  nkda    MEDS: asa 81 mg qd  losartan 50 mg qd  lipitor 80 mg qhs  folic acid 800 mg qd  hydroxyzine 10 mg  vit D3    FH:  mother -  61 alcoholism, emphysema  father -  87  sister - alive, 82, breast cancer

## 2024-06-20 LAB
ALBUMIN SERPL ELPH-MCNC: 3.8 G/DL
ALP BLD-CCNC: 74 U/L
ALT SERPL-CCNC: 19 U/L
ANION GAP SERPL CALC-SCNC: 12 MMOL/L
AST SERPL-CCNC: 22 U/L
BASOPHILS # BLD AUTO: 0.02 K/UL
BASOPHILS NFR BLD AUTO: 0.3 %
BILIRUB SERPL-MCNC: 0.9 MG/DL
BUN SERPL-MCNC: 21 MG/DL
CALCIUM SERPL-MCNC: 9.9 MG/DL
CHLORIDE SERPL-SCNC: 105 MMOL/L
CHOLEST SERPL-MCNC: 192 MG/DL
CO2 SERPL-SCNC: 27 MMOL/L
CREAT SERPL-MCNC: 0.89 MG/DL
CREAT SPEC-SCNC: 130 MG/DL
EGFR: 62 ML/MIN/1.73M2
EOSINOPHIL # BLD AUTO: 0.16 K/UL
EOSINOPHIL NFR BLD AUTO: 2.4 %
ESTIMATED AVERAGE GLUCOSE: 108 MG/DL
GLUCOSE SERPL-MCNC: 101 MG/DL
HBA1C MFR BLD HPLC: 5.4 %
HCT VFR BLD CALC: 37.5 %
HDLC SERPL-MCNC: 77 MG/DL
HGB BLD-MCNC: 12.2 G/DL
IMM GRANULOCYTES NFR BLD AUTO: 0.1 %
LDLC SERPL CALC-MCNC: 101 MG/DL
LYMPHOCYTES # BLD AUTO: 1.9 K/UL
LYMPHOCYTES NFR BLD AUTO: 28.1 %
MAGNESIUM SERPL-MCNC: 1.8 MG/DL
MAN DIFF?: NORMAL
MCHC RBC-ENTMCNC: 31.9 PG
MCHC RBC-ENTMCNC: 32.5 GM/DL
MCV RBC AUTO: 97.9 FL
MICROALBUMIN 24H UR DL<=1MG/L-MCNC: <1.2 MG/DL
MICROALBUMIN/CREAT 24H UR-RTO: NORMAL MG/G
MONOCYTES # BLD AUTO: 0.49 K/UL
MONOCYTES NFR BLD AUTO: 7.3 %
NEUTROPHILS # BLD AUTO: 4.17 K/UL
NEUTROPHILS NFR BLD AUTO: 61.8 %
NONHDLC SERPL-MCNC: 115 MG/DL
PLATELET # BLD AUTO: 176 K/UL
POTASSIUM SERPL-SCNC: 4.8 MMOL/L
PROT SERPL-MCNC: 6.5 G/DL
RBC # BLD: 3.83 M/UL
RBC # FLD: 14.2 %
SODIUM SERPL-SCNC: 144 MMOL/L
TRIGL SERPL-MCNC: 77 MG/DL
TSH SERPL-ACNC: 3.22 UIU/ML
WBC # FLD AUTO: 6.75 K/UL

## 2024-07-09 ENCOUNTER — NON-APPOINTMENT (OUTPATIENT)
Age: 89
End: 2024-07-09

## 2024-07-09 DIAGNOSIS — I82.492 ACUTE EMBOLISM AND THROMBOSIS OF OTHER SPECIFIED DEEP VEIN OF LEFT LOWER EXTREMITY: ICD-10-CM

## 2024-07-10 ENCOUNTER — APPOINTMENT (OUTPATIENT)
Dept: HEART AND VASCULAR | Facility: CLINIC | Age: 89
End: 2024-07-10
Payer: MEDICARE

## 2024-07-10 VITALS
DIASTOLIC BLOOD PRESSURE: 76 MMHG | SYSTOLIC BLOOD PRESSURE: 144 MMHG | WEIGHT: 190 LBS | OXYGEN SATURATION: 95 % | HEART RATE: 86 BPM | BODY MASS INDEX: 33.66 KG/M2 | HEIGHT: 63 IN | TEMPERATURE: 97.6 F

## 2024-07-10 DIAGNOSIS — Z86.718 PERSONAL HISTORY OF OTHER VENOUS THROMBOSIS AND EMBOLISM: ICD-10-CM

## 2024-07-10 DIAGNOSIS — I87.2 VENOUS INSUFFICIENCY (CHRONIC) (PERIPHERAL): ICD-10-CM

## 2024-07-10 DIAGNOSIS — R60.0 LOCALIZED EDEMA: ICD-10-CM

## 2024-07-10 DIAGNOSIS — I87.009 POSTTHROMBOTIC SYNDROME W/OUT COMPLICATIONS OF UNSPECIFIED EXTREMITY: ICD-10-CM

## 2024-07-10 DIAGNOSIS — E66.9 OBESITY, UNSPECIFIED: ICD-10-CM

## 2024-07-10 DIAGNOSIS — M17.10 UNILATERAL PRIMARY OSTEOARTHRITIS, UNSPECIFIED KNEE: ICD-10-CM

## 2024-07-10 PROCEDURE — 93970 EXTREMITY STUDY: CPT

## 2024-07-10 PROCEDURE — 99214 OFFICE O/P EST MOD 30 MIN: CPT | Mod: 25

## 2024-10-07 ENCOUNTER — RX RENEWAL (OUTPATIENT)
Age: 89
End: 2024-10-07

## 2024-12-17 ENCOUNTER — APPOINTMENT (OUTPATIENT)
Dept: HEART AND VASCULAR | Facility: CLINIC | Age: 89
End: 2024-12-17

## 2025-01-27 ENCOUNTER — NON-APPOINTMENT (OUTPATIENT)
Age: 89
End: 2025-01-27

## 2025-01-27 ENCOUNTER — APPOINTMENT (OUTPATIENT)
Dept: HEART AND VASCULAR | Facility: CLINIC | Age: 89
End: 2025-01-27
Payer: MEDICARE

## 2025-01-27 VITALS
OXYGEN SATURATION: 95 % | BODY MASS INDEX: 33.31 KG/M2 | HEART RATE: 77 BPM | SYSTOLIC BLOOD PRESSURE: 118 MMHG | DIASTOLIC BLOOD PRESSURE: 76 MMHG | WEIGHT: 188 LBS | HEIGHT: 63 IN

## 2025-01-27 PROCEDURE — 99214 OFFICE O/P EST MOD 30 MIN: CPT

## 2025-01-27 PROCEDURE — 93000 ELECTROCARDIOGRAM COMPLETE: CPT

## 2025-01-27 PROCEDURE — G2211 COMPLEX E/M VISIT ADD ON: CPT

## 2025-01-27 PROCEDURE — 36415 COLL VENOUS BLD VENIPUNCTURE: CPT

## 2025-01-28 LAB
CHOLEST SERPL-MCNC: 180 MG/DL
HDLC SERPL-MCNC: 88 MG/DL
LDLC SERPL CALC-MCNC: 75 MG/DL
NONHDLC SERPL-MCNC: 92 MG/DL
TRIGL SERPL-MCNC: 96 MG/DL

## 2025-03-24 ENCOUNTER — RX RENEWAL (OUTPATIENT)
Age: 89
End: 2025-03-24

## 2025-08-11 ENCOUNTER — RX RENEWAL (OUTPATIENT)
Age: 89
End: 2025-08-11

## 2025-09-05 ENCOUNTER — NON-APPOINTMENT (OUTPATIENT)
Age: 89
End: 2025-09-05

## 2025-09-15 ENCOUNTER — APPOINTMENT (OUTPATIENT)
Dept: HEART AND VASCULAR | Facility: CLINIC | Age: 89
End: 2025-09-15
Payer: MEDICARE

## 2025-09-15 VITALS
DIASTOLIC BLOOD PRESSURE: 65 MMHG | TEMPERATURE: 97.8 F | HEIGHT: 63 IN | BODY MASS INDEX: 31.71 KG/M2 | WEIGHT: 179 LBS | SYSTOLIC BLOOD PRESSURE: 126 MMHG | OXYGEN SATURATION: 95 % | HEART RATE: 78 BPM

## 2025-09-15 PROCEDURE — G2211 COMPLEX E/M VISIT ADD ON: CPT

## 2025-09-15 PROCEDURE — 99214 OFFICE O/P EST MOD 30 MIN: CPT

## 2025-09-15 PROCEDURE — 93000 ELECTROCARDIOGRAM COMPLETE: CPT
